# Patient Record
Sex: MALE | Race: WHITE | Employment: FULL TIME
[De-identification: names, ages, dates, MRNs, and addresses within clinical notes are randomized per-mention and may not be internally consistent; named-entity substitution may affect disease eponyms.]

---

## 2017-02-01 PROBLEM — J96.01 ACUTE RESPIRATORY FAILURE WITH HYPOXIA (HCC): Status: ACTIVE | Noted: 2017-02-01

## 2017-02-13 ENCOUNTER — TELEPHONE (OUTPATIENT)
Dept: CASE MANAGEMENT | Age: 52
End: 2017-02-13

## 2021-11-17 ENCOUNTER — HOSPITAL ENCOUNTER (EMERGENCY)
Age: 56
Discharge: HOME | End: 2021-11-17
Attending: EMERGENCY MEDICINE
Payer: MEDICAID

## 2021-11-17 VITALS
SYSTOLIC BLOOD PRESSURE: 150 MMHG | RESPIRATION RATE: 11 BRPM | HEART RATE: 71 BPM | HEIGHT: 69 IN | OXYGEN SATURATION: 96 % | BODY MASS INDEX: 23.61 KG/M2 | DIASTOLIC BLOOD PRESSURE: 85 MMHG | WEIGHT: 159.39 LBS | TEMPERATURE: 97.8 F

## 2021-11-17 DIAGNOSIS — R42 VERTIGO: Primary | ICD-10-CM

## 2021-11-17 LAB
A/G RATIO: 1.8 (ref 1.1–2.2)
ALBUMIN SERPL-MCNC: 4.4 G/DL (ref 3.4–5)
ALP BLD-CCNC: 79 U/L (ref 40–129)
ALT SERPL-CCNC: 17 U/L (ref 10–40)
ANION GAP SERPL CALCULATED.3IONS-SCNC: 15 MMOL/L (ref 3–16)
AST SERPL-CCNC: 30 U/L (ref 15–37)
BASOPHILS ABSOLUTE: 0 K/UL (ref 0–0.2)
BASOPHILS RELATIVE PERCENT: 0.3 %
BILIRUB SERPL-MCNC: 0.3 MG/DL (ref 0–1)
BUN BLDV-MCNC: 8 MG/DL (ref 7–20)
CALCIUM SERPL-MCNC: 8.8 MG/DL (ref 8.3–10.6)
CHLORIDE BLD-SCNC: 99 MMOL/L (ref 99–110)
CO2: 22 MMOL/L (ref 21–32)
CREAT SERPL-MCNC: 0.7 MG/DL (ref 0.9–1.3)
EKG ATRIAL RATE: 73 BPM
EKG DIAGNOSIS: NORMAL
EKG P AXIS: 51 DEGREES
EKG P-R INTERVAL: 132 MS
EKG Q-T INTERVAL: 378 MS
EKG QRS DURATION: 82 MS
EKG QTC CALCULATION (BAZETT): 416 MS
EKG R AXIS: 35 DEGREES
EKG T AXIS: 66 DEGREES
EKG VENTRICULAR RATE: 73 BPM
EOSINOPHILS ABSOLUTE: 0.2 K/UL (ref 0–0.6)
EOSINOPHILS RELATIVE PERCENT: 3.7 %
GFR AFRICAN AMERICAN: >60
GFR NON-AFRICAN AMERICAN: >60
GLUCOSE BLD-MCNC: 87 MG/DL (ref 70–99)
HCT VFR BLD CALC: 44.9 % (ref 40.5–52.5)
HEMOGLOBIN: 15.3 G/DL (ref 13.5–17.5)
LYMPHOCYTES ABSOLUTE: 1.7 K/UL (ref 1–5.1)
LYMPHOCYTES RELATIVE PERCENT: 30.9 %
MCH RBC QN AUTO: 35.1 PG (ref 26–34)
MCHC RBC AUTO-ENTMCNC: 34.2 G/DL (ref 31–36)
MCV RBC AUTO: 102.7 FL (ref 80–100)
MONOCYTES ABSOLUTE: 0.5 K/UL (ref 0–1.3)
MONOCYTES RELATIVE PERCENT: 8.5 %
NEUTROPHILS ABSOLUTE: 3.1 K/UL (ref 1.7–7.7)
NEUTROPHILS RELATIVE PERCENT: 56.6 %
PDW BLD-RTO: 13.4 % (ref 12.4–15.4)
PLATELET # BLD: 253 K/UL (ref 135–450)
PMV BLD AUTO: 7.2 FL (ref 5–10.5)
POTASSIUM REFLEX MAGNESIUM: 4.2 MMOL/L (ref 3.5–5.1)
RBC # BLD: 4.37 M/UL (ref 4.2–5.9)
SODIUM BLD-SCNC: 136 MMOL/L (ref 136–145)
TOTAL PROTEIN: 6.8 G/DL (ref 6.4–8.2)
TROPONIN: <0.01 NG/ML
WBC # BLD: 5.5 K/UL (ref 4–11)

## 2021-11-17 PROCEDURE — 80053 COMPREHEN METABOLIC PANEL: CPT

## 2021-11-17 PROCEDURE — 84484 ASSAY OF TROPONIN QUANT: CPT

## 2021-11-17 PROCEDURE — 93005 ELECTROCARDIOGRAM TRACING: CPT | Performed by: EMERGENCY MEDICINE

## 2021-11-17 PROCEDURE — 99284 EMERGENCY DEPT VISIT MOD MDM: CPT

## 2021-11-17 PROCEDURE — 6370000000 HC RX 637 (ALT 250 FOR IP): Performed by: PHYSICIAN ASSISTANT

## 2021-11-17 PROCEDURE — 93010 ELECTROCARDIOGRAM REPORT: CPT | Performed by: INTERNAL MEDICINE

## 2021-11-17 PROCEDURE — 85025 COMPLETE CBC W/AUTO DIFF WBC: CPT

## 2021-11-17 RX ORDER — ONDANSETRON 4 MG/1
4 TABLET, ORALLY DISINTEGRATING ORAL ONCE
Status: COMPLETED | OUTPATIENT
Start: 2021-11-17 | End: 2021-11-17

## 2021-11-17 RX ORDER — PROMETHAZINE HYDROCHLORIDE 25 MG/1
25 TABLET ORAL EVERY 6 HOURS PRN
Qty: 10 TABLET | Refills: 0 | Status: SHIPPED | OUTPATIENT
Start: 2021-11-17 | End: 2021-11-24

## 2021-11-17 RX ORDER — MECLIZINE HYDROCHLORIDE 25 MG/1
25 TABLET ORAL ONCE
Status: COMPLETED | OUTPATIENT
Start: 2021-11-17 | End: 2021-11-17

## 2021-11-17 RX ORDER — MECLIZINE HYDROCHLORIDE 25 MG/1
25 TABLET ORAL 3 TIMES DAILY PRN
Qty: 10 TABLET | Refills: 0 | Status: SHIPPED | OUTPATIENT
Start: 2021-11-17

## 2021-11-17 RX ADMIN — MECLIZINE HYDROCHLORIDE 25 MG: 25 TABLET ORAL at 16:45

## 2021-11-17 RX ADMIN — ONDANSETRON 4 MG: 4 TABLET, ORALLY DISINTEGRATING ORAL at 16:45

## 2021-11-17 ASSESSMENT — ENCOUNTER SYMPTOMS
SHORTNESS OF BREATH: 0
VOMITING: 0
NAUSEA: 0

## 2021-11-17 NOTE — ED TRIAGE NOTES
Patient admitted to ED via private car with complaints of lightheaded and dizziness. Patient states that it started around 3pm yesterday and he went and laid down until about 10pm. He felt better, but is feeling the same a little bit today, but states that it is not as bad as yesterday. Patient denies any other symptoms, such as N/V/D. He has felt like this in the past and was prescribed Meclizine it appears in the chart. Patient does not take this anymore. Denies medical history. BP is elevated. Other VS are WNL. Patient is alert and oriented x4.

## 2021-11-17 NOTE — ED NOTES
D/C: Order noted for d/c. Pt confirmed d/c paperwork and two prescriptions have correct name. Discharge and education instructions reviewed with patient. Teach-back successful. Pt verbalized understanding and signed d/c papers. Pt denied questions at this time. No acute distress noted. Patient instructed to follow-up as noted - return to emergency department if symptoms worsen. Patient verbalized understanding. Discharged per EDMD with discharge instructions. Pt discharged to private vehicle. Patient stable upon departure. Thanked patient for choosing [facility] for care. Provider aware of patient pain at time of discharge.        Earlene Atkins RN  11/17/21 [id number]

## 2021-11-17 NOTE — ED PROVIDER NOTES
[facility]      Pt Name: Dav Rios  MRN: [id number]  [place] 1965  Date of evaluation: 11/17/2021  Provider: AMOS Bragg    This patient was seen and evaluated by the attending physician Maisha Berry MD.      CHIEF COMPLAINT     dizziness      HISTORY OF PRESENT ILLNESS  (Location/Symptom, Timing/Onset, Context/Setting, Quality, Duration, Modifying Factors, Severity.)   Dav Rios is a 49 y.o. male who presents to the emergency department for dizziness that is described as vertigo. It started yesterday. Occurs when he stands up or turns his head. Reports has had episodes of this every few months. Has never been evaluated for it. He denies any numbness, tingling, vision changes, difficulty talking or walking. He denies chest pain shortness of breath, nausea, vomiting, fever, chills. Denies history of CVA. Nursing Notes were reviewed and I agree. REVIEW OF SYSTEMS    (2-9 systems for level 4, 10 or more for level 5)     Review of Systems   Constitutional: Negative for chills and fever. Eyes: Negative for visual disturbance. Respiratory: Negative for shortness of breath. Cardiovascular: Negative for chest pain. Gastrointestinal: Negative for nausea and vomiting. Neurological: Positive for dizziness. Negative for speech difficulty, weakness and numbness. Except as noted above the remainder of the review of systems was reviewed and negative. PAST MEDICAL HISTORY   History reviewed. No pertinent past medical history.     SURGICAL HISTORY           Procedure Laterality Date    HERNIA REPAIR         CURRENT MEDICATIONS       Discharge Medication List as of 11/17/2021  5:36 PM      CONTINUE these medications which have NOT CHANGED    Details   tamsulosin (FLOMAX) 0.4 MG capsule Take 1 capsule by mouth daily for 5 doses, Disp-5 capsule, R-0Print      naproxen (NAPROSYN) 500 MG tablet Take 1 tablet by mouth 2 times daily (with meals), Disp-30 tablet, R-0Print      ondansetron (ZOFRAN ODT) 4 MG disintegrating tablet Take 1-2 tablets by mouth every 12 hours as needed for Nausea May Sub regular tablet (non-ODT) if insurance does not cover ODT., Disp-12 tablet, R-0Print      varenicline (CHANTIX) 0.5 MG tablet Take 1 tablet by mouth 2 times daily (with meals), Disp-60 tablet, R-3      cetirizine (ZYRTEC) 10 MG tablet Take 10 mg by mouth daily. EPINEPHrine (EPIPEN 2-TAMMY) 0.3 MG/0.3ML BAL injection Inject 0.3 mLs into the skin once for 1 dose. Use as directed for allergic reaction, Disp-2 Device, R-3             ALLERGIES     Codeine and Nutritional supplements    FAMILY HISTORY           Problem Relation Age of Onset    Emphysema Father      Family Status   Relation Name Status    Father  (Not Specified)        SOCIAL HISTORY      reports that he has been smoking. He has been smoking about 1.00 pack per day. He has never used smokeless tobacco. He reports current alcohol use. He reports that he does not use drugs. PHYSICAL EXAM    (up to 7 for level 4, 8 or more for level 5)     ED Triage Vitals [11/17/21 1410]   BP Temp Temp Source Pulse Resp SpO2 Height Weight   (!) 172/88 97.8 °F (36.6 °C) Temporal 81 16 97 % 5' 9\" (1.753 m) 159 lb 6.3 oz (72.3 kg)       Physical Exam  Constitutional:       General: He is not in acute distress. Appearance: Normal appearance. He is well-developed. He is not ill-appearing, toxic-appearing or diaphoretic. HENT:      Head: Normocephalic and atraumatic. Right Ear: Ear canal and external ear normal.      Left Ear: Ear canal and external ear normal.      Ears:      Comments: Serous effusion bilaterally  No erythema of TMs bilaterally  Eyes:      Extraocular Movements: Extraocular movements intact. Conjunctiva/sclera: Conjunctivae normal.      Pupils: Pupils are equal, round, and reactive to light.    Cardiovascular:      Rate and Rhythm: Normal rate and regular rhythm. Heart sounds: Normal heart sounds. Pulmonary:      Effort: Pulmonary effort is normal. No respiratory distress. Breath sounds: Normal breath sounds. Musculoskeletal:         General: Normal range of motion. Cervical back: Normal range of motion and neck supple. Skin:     General: Skin is warm. Neurological:      Mental Status: He is alert. Sensory: No sensory deficit. Motor: No weakness. Coordination: Coordination normal.      Comments: Normal finger to nose, rapid alternating hands, heel to shin bilaterally  5/5 strength all 4 extremities  No pronator drift  Normal and symmetric  strength bilaterally  Normal and symmetric sensation throughout  No facial drooping  No dysarthria  No aphasia  Negative test of skew  Has some horizontal nystagmus bilaterally  No rotatory nystagmus  Ambulates with a steady gait   Psychiatric:         Mood and Affect: Mood normal.         Behavior: Behavior normal.         Thought Content: Thought content normal.         Judgment: Judgment normal.         DIFFERENTIAL DIAGNOSIS   Vertigo, CVA, other    DIAGNOSTICRESULTS     EKG: All EKG's are interpreted by AMOS Blake in the absence of a cardiologist.    EKG obtained. EKG obtained. See Dr. Fidencio Guillen note for interpretation.     RADIOLOGY:   Non-plain film images such as CT, Ultrasound and MRI are read by the radiologist. Dejon Hernandez radiographic images are visualized and preliminarily interpreted by AMOS Blake with the below findings:      Interpretation per the Radiologist below, if available at the time of this note:    No orders to display         LABS:  Results for orders placed or performed during the hospital encounter of 11/17/21   CBC Auto Differential   Result Value Ref Range    WBC 5.5 4.0 - 11.0 K/uL    RBC 4.37 4.20 - 5.90 M/uL    Hemoglobin 15.3 13.5 - 17.5 g/dL    Hematocrit 44.9 40.5 - 52.5 %    .7 (H) 80.0 - 100.0 fL    MCH 35.1 (H) 26.0 - 34.0 pg    MCHC 34.2 31.0 - 36.0 g/dL    RDW 13.4 12.4 - 15.4 %    Platelets 421 410 - 716 K/uL    MPV 7.2 5.0 - 10.5 fL    Neutrophils % 56.6 %    Lymphocytes % 30.9 %    Monocytes % 8.5 %    Eosinophils % 3.7 %    Basophils % 0.3 %    Neutrophils Absolute 3.1 1.7 - 7.7 K/uL    Lymphocytes Absolute 1.7 1.0 - 5.1 K/uL    Monocytes Absolute 0.5 0.0 - 1.3 K/uL    Eosinophils Absolute 0.2 0.0 - 0.6 K/uL    Basophils Absolute 0.0 0.0 - 0.2 K/uL   Comprehensive Metabolic Panel w/ Reflex to MG   Result Value Ref Range    Sodium 136 136 - 145 mmol/L    Potassium reflex Magnesium 4.2 3.5 - 5.1 mmol/L    Chloride 99 99 - 110 mmol/L    CO2 22 21 - 32 mmol/L    Anion Gap 15 3 - 16    Glucose 87 70 - 99 mg/dL    BUN 8 7 - 20 mg/dL    CREATININE 0.7 (L) 0.9 - 1.3 mg/dL    GFR Non-African American >60 >60    GFR African American >60 >60    Calcium 8.8 8.3 - 10.6 mg/dL    Total Protein 6.8 6.4 - 8.2 g/dL    Albumin 4.4 3.4 - 5.0 g/dL    Albumin/Globulin Ratio 1.8 1.1 - 2.2    Total Bilirubin 0.3 0.0 - 1.0 mg/dL    Alkaline Phosphatase 79 40 - 129 U/L    ALT 17 10 - 40 U/L    AST 30 15 - 37 U/L   Troponin   Result Value Ref Range    Troponin <0.01 <0.01 ng/mL   EKG 12 Lead   Result Value Ref Range    Ventricular Rate 73 BPM    Atrial Rate 73 BPM    P-R Interval 132 ms    QRS Duration 82 ms    Q-T Interval 378 ms    QTc Calculation (Bazett) 416 ms    P Axis 51 degrees    R Axis 35 degrees    T Axis 66 degrees    Diagnosis       Normal sinus rhythmBaseline artifactNormal ECGWhen compared with ECG of 01-FEB-2017 03:06,No significant change was foundConfirmed by [facility] Kiana BRAUN MD ([id number]) on 11/17/2021 3:23:30 PM       All other labs were withinnormal range or not returned as of this dictation.     EMERGENCY DEPARTMENT COURSE and DIFFERENTIAL DIAGNOSIS/MDM:   Vitals:    Vitals:    11/17/21 1410 11/17/21 1451 11/17/21 1501 11/17/21 1738   BP: (!) 172/88 (!) 143/86 (!) 147/87 (!) 150/85   Pulse: 81 76 75 71   Resp: 16 16 16 11 Temp: 97.8 °F (36.6 °C)      TempSrc: Temporal      SpO2: 97% 95% 95% 96%   Weight: 159 lb 6.3 oz (72.3 kg)      Height: 5' 9\" (1.753 m)          Patient was nontoxic, well appearing, afebrile with normal vital signs with exception of hypertension 172/88. Saturating well on room air. NIHSS is 0 with negative test of skew and ambulates with a steady gait. Have a low suspicion for CVA at this time. History is suggestive of vertigo. Was given meclizine and zofran here. Labs unremarkable. Upon reevaluation, appears well. Believe appropriate for outpatient management. Instructed to FU with ENT in next few days for reeval and to return for worsening. He agreed and understood. No emergent MRI or hospitalization for dizziness indicated. I completed a structured, evidence-based clinical evaluation to screen for acute stroke and neurologic deficits in this patient. The patient has a normal detailed neurologic exam, which is highly sensitive for dangerous causes of dizziness, vertigo, or loss of balance. The evidence indicates that the patient is very low risk for an acute neurologic emergency and this is consistent with my clinical intuition. The risk of further workup or hospitalization is likely higher than the risk of the patient having a stroke or other dangerous neurologic condition. It is, therefore, in the patients best interest not to do additional emergent testing or to be hospitalized at this time. PROCEDURES:  None    FINAL IMPRESSION      1.  Vertigo          DISPOSITION/PLAN   DISPOSITION Decision To Discharge 11/17/2021 05:25:31 PM      PATIENT REFERRED TO:  MELLO Salcedo [address]  [address] [address] [address]  [phone]    Schedule an appointment as soon as possible for a visit in 2 days  for reevaluation    [facility] Emergency Department  [address] [facility]  [phone]    As needed, If symptoms worsen      DISCHARGE MEDICATIONS:  Discharge Medication List as of 11/17/2021  5:36 PM      START taking these medications    Details   promethazine (PHENERGAN) 25 MG tablet Take 1 tablet by mouth every 6 hours as needed for Nausea, Disp-10 tablet, R-0Print             (Please note that portions of this note werecompleted with a voice recognition program.  Efforts were made to edit the dictations but occasionally words are mis-transcribed.)    Ruby Horne, [address] [place] [address]  11/17/21 2025

## 2021-11-17 NOTE — LETTER
Rad Munoz was seen and treated in our emergency department on 11/17/2021. He may return to work on 11/18/2021. If you have any questions or concerns, please don't hesitate to call.       AMOS Knapp

## 2021-11-17 NOTE — ED PROVIDER NOTES
I have personally performed a face to face diagnostic evaluation on this patient. I have fully participated in the care of this patient. I have reviewed and agree with all pertinent clinical information including history, physical exam, diagnostic tests, and the plan. HPI: Gabi Gonzalez presented with dizziness. Patient states that started yesterday waxing and waning. Worse with moving his head. Better with sitting still. This is happened multiple times. He has never followed up with a specialist.  Better with Antivert. No nausea or vomiting at this time. No other numbness or weakness. No headache. Similar to previous episodes. No recent trauma. Patient was at work when the symptoms came on. See [facility] note for further details. Chief Complaint   Patient presents with    Dizziness     started yesterday about 2pm. more severe with ambulating. more severe yesterday. occures often throughout day with ambulation. Review of Systems: See [facility] note  Vital Signs: BP (!) 147/87   Pulse 75   Temp 97.8 °F (36.6 °C) (Temporal)   Resp 16   Ht 5' 9\" (1.753 m)   Wt 159 lb 6.3 oz (72.3 kg)   SpO2 95%   BMI 23.54 kg/m²     Alert 49 y.o. male who does not appear toxic or acutely ill  HENT: Atraumatic, oral mucosa moist, TMs not injected, some mild fluid behind right TM  Neck: Grossly normal ROM  Chest/Lungs: respiratory effort normal   Abdomen: Soft nontender  Extremities: 2+ radial bilaterally  Musculoskeletal: Grossly normal ROM  Skin: No palor or diaphoresis  Neuro: Nonfocal neurologic exam, no cerebellar findings, cranial nerve exam unremarkable. Gait normal  Medical Decision Making and Plan:  Pertinent Labs & Imaging studies reviewed. (See [facility] chart for details)  I agree with assessment and plan. Nonfocal neurologic exam at this time. No concern for central cause of vertigo. Likely peripheral.  Likely BPPV.   Will give follow-up with ENT and strict return precautions for any new or worsening symptoms.     EKG Interpretation    Interpreted by emergency department physician    Rhythm: normal sinus   Rate: normal  Axis: normal  Ectopy: none  Conduction: normal  ST Segments: normal  T Waves: normal  Q Waves: none    Clinical Impression: non-specific EKG and otherwise normal EKG    MD Tyrel Quintana MD  11/17/21 [address], [place]  11/17/21 2331